# Patient Record
Sex: MALE | Race: WHITE
[De-identification: names, ages, dates, MRNs, and addresses within clinical notes are randomized per-mention and may not be internally consistent; named-entity substitution may affect disease eponyms.]

---

## 2018-09-24 ENCOUNTER — TRANSCRIPTION ENCOUNTER (OUTPATIENT)
Age: 38
End: 2018-09-24

## 2019-11-08 ENCOUNTER — TRANSCRIPTION ENCOUNTER (OUTPATIENT)
Age: 39
End: 2019-11-08

## 2021-01-13 ENCOUNTER — APPOINTMENT (OUTPATIENT)
Dept: GASTROENTEROLOGY | Facility: CLINIC | Age: 41
End: 2021-01-13

## 2021-05-14 ENCOUNTER — APPOINTMENT (OUTPATIENT)
Dept: HEMATOLOGY ONCOLOGY | Facility: CLINIC | Age: 41
End: 2021-05-14
Payer: MEDICAID

## 2021-05-14 ENCOUNTER — LABORATORY RESULT (OUTPATIENT)
Age: 41
End: 2021-05-14

## 2021-05-14 VITALS
TEMPERATURE: 97.3 F | BODY MASS INDEX: 27.16 KG/M2 | SYSTOLIC BLOOD PRESSURE: 128 MMHG | HEART RATE: 97 BPM | HEIGHT: 71 IN | DIASTOLIC BLOOD PRESSURE: 78 MMHG | WEIGHT: 194 LBS | RESPIRATION RATE: 14 BRPM

## 2021-05-14 DIAGNOSIS — Z78.9 OTHER SPECIFIED HEALTH STATUS: ICD-10-CM

## 2021-05-14 LAB
ALBUMIN SERPL ELPH-MCNC: 4.5 G/DL
ALP BLD-CCNC: 85 U/L
ALT SERPL-CCNC: 50 U/L
ANION GAP SERPL CALC-SCNC: 12 MMOL/L
AST SERPL-CCNC: 40 U/L
BILIRUB SERPL-MCNC: 0.5 MG/DL
BUN SERPL-MCNC: 13 MG/DL
CALCIUM SERPL-MCNC: 10 MG/DL
CHLORIDE SERPL-SCNC: 100 MMOL/L
CO2 SERPL-SCNC: 27 MMOL/L
CREAT SERPL-MCNC: 0.8 MG/DL
GLUCOSE SERPL-MCNC: 83 MG/DL
HCT VFR BLD CALC: 40.2 %
HGB BLD-MCNC: 14.2 G/DL
MCHC RBC-ENTMCNC: 32.9 PG
MCHC RBC-ENTMCNC: 35.3 G/DL
MCV RBC AUTO: 93.3 FL
PLATELET # BLD AUTO: 237 K/UL
PMV BLD: 8.9 FL
POTASSIUM SERPL-SCNC: 4.5 MMOL/L
PROT SERPL-MCNC: 7.1 G/DL
RBC # BLD: 4.31 M/UL
RBC # FLD: 18.3 %
SODIUM SERPL-SCNC: 139 MMOL/L
WBC # FLD AUTO: 4.48 K/UL

## 2021-05-14 PROCEDURE — 99205 OFFICE O/P NEW HI 60 MIN: CPT

## 2021-05-14 RX ORDER — FLUOROURACIL 50 MG/ML
500 INJECTION, SOLUTION INTRAVENOUS
Qty: 2 | Refills: 4 | Status: ACTIVE | COMMUNITY
Start: 2021-05-14

## 2021-05-14 NOTE — HISTORY OF PRESENT ILLNESS
[Disease: _____________________] : Disease: [unfilled] [AJCC Stage: ____] : AJCC Stage: [unfilled] [de-identified] : The patient is a 40 year old white male, referred by Dr. Bello, from [place], for continuation of his chemotherapy with Folfox for his relatively recent diagnosis of colon carcinoma, S/P surgery, followed by 7 cycles of the same regimen. \par The patient was having some vague abdominal pain and blood in the stools which prompted the GI work. He had the symptoms for almost a month. He was found a stage III disease. He underwent surgery and he was found to have 13 positive lymph nodes out of 17 dissected. He recovered well from surgery and started the adjuvant chemotherapy treatment as above.\par The patient had some reactions from the chemotherapy including difficulty swallowing, numbness of the mouth, metal taste. Eventually oxaliplatin was dropped after the 6 th cycle and he did well on 5 FU and leucovorin (standard approach). \par The patient did well with the 7th cycle and is willing to continue the same way with the subsequent 5 treatments left.\par At present, the patient is feeling well. No diarrhea, nausea or vomiting. He never had fever with the treatments.\par He had never had a colonoscopy before.  [de-identified] : Adenocarcinoma

## 2021-05-14 NOTE — ASSESSMENT
[FreeTextEntry1] : Stage IIIC colon adenocarcinoma, left side, S/P 6 cycles of Folfox then the 7th one without the oxaliplatin because of toxicity.\par The situation was discussed with the patient. \par We were in touch with Dr. Bello who also sent the patient's records.\par The patient was told that, in addition to those records, the hospital will require the pathology slides for confirmation of the diagnosis. The patient signed a medical record release form.\par \par For today, we will draw CBC, CMP and CEA. \par The patient is scheduled to continue his Folfox regiment without the oxaliplatin for the next remaining 5 treatments.\par He is already aware of the risks and side effects of the treatments. \par \par The patient will be seen on  monthly basis and as needed.\par \par All questions answered.

## 2021-05-14 NOTE — REASON FOR VISIT
[Initial Consultation] : an initial consultation [Parent] : parent [FreeTextEntry2] : Colon carcinoma

## 2021-05-17 LAB — CEA SERPL-MCNC: 1.3 NG/ML

## 2021-05-18 ENCOUNTER — APPOINTMENT (OUTPATIENT)
Dept: INFUSION THERAPY | Facility: CLINIC | Age: 41
End: 2021-05-18

## 2021-05-18 RX ORDER — ONDANSETRON 8 MG/1
8 TABLET ORAL EVERY 8 HOURS
Qty: 45 | Refills: 3 | Status: ACTIVE | COMMUNITY
Start: 2021-05-18 | End: 1900-01-01

## 2021-05-18 RX ORDER — FLUOROURACIL 50 MG/ML
800 INJECTION, SOLUTION INTRAVENOUS ONCE
Refills: 0 | Status: COMPLETED | OUTPATIENT
Start: 2021-05-18 | End: 2021-05-18

## 2021-05-18 RX ORDER — FLUOROURACIL 50 MG/ML
4800 INJECTION, SOLUTION INTRAVENOUS ONCE
Refills: 0 | Status: COMPLETED | OUTPATIENT
Start: 2021-05-18 | End: 2021-05-18

## 2021-05-18 RX ORDER — LEUCOVORIN CALCIUM 5 MG
800 TABLET ORAL ONCE
Refills: 0 | Status: COMPLETED | OUTPATIENT
Start: 2021-05-18 | End: 2021-05-18

## 2021-05-18 RX ORDER — PROCHLORPERAZINE MALEATE 10 MG/1
10 TABLET ORAL EVERY 6 HOURS
Qty: 120 | Refills: 1 | Status: ACTIVE | COMMUNITY
Start: 2021-05-18 | End: 1900-01-01

## 2021-05-18 RX ORDER — ONDANSETRON 8 MG/1
16 TABLET, FILM COATED ORAL ONCE
Refills: 0 | Status: COMPLETED | OUTPATIENT
Start: 2021-05-18 | End: 2021-05-18

## 2021-05-18 RX ADMIN — Medication 145 MILLIGRAM(S): at 11:19

## 2021-05-18 RX ADMIN — ONDANSETRON 16 MILLIGRAM(S): 8 TABLET, FILM COATED ORAL at 10:42

## 2021-05-18 RX ADMIN — Medication 800 MILLIGRAM(S): at 12:50

## 2021-05-18 RX ADMIN — FLUOROURACIL 5.24 MILLIGRAM(S): 50 INJECTION, SOLUTION INTRAVENOUS at 13:10

## 2021-05-18 RX ADMIN — FLUOROURACIL 192 MILLIGRAM(S): 50 INJECTION, SOLUTION INTRAVENOUS at 13:03

## 2021-05-19 ENCOUNTER — NON-APPOINTMENT (OUTPATIENT)
Age: 41
End: 2021-05-19

## 2021-05-20 ENCOUNTER — APPOINTMENT (OUTPATIENT)
Dept: INFUSION THERAPY | Facility: CLINIC | Age: 41
End: 2021-05-20

## 2021-06-01 ENCOUNTER — LABORATORY RESULT (OUTPATIENT)
Age: 41
End: 2021-06-01

## 2021-06-01 ENCOUNTER — APPOINTMENT (OUTPATIENT)
Dept: INFUSION THERAPY | Facility: CLINIC | Age: 41
End: 2021-06-01

## 2021-06-01 DIAGNOSIS — Z00.00 ENCOUNTER FOR GENERAL ADULT MEDICAL EXAMINATION W/OUT ABNORMAL FINDINGS: ICD-10-CM

## 2021-06-01 LAB
HCT VFR BLD CALC: 41.3 %
HGB BLD-MCNC: 14.8 G/DL
MCHC RBC-ENTMCNC: 33.4 PG
MCHC RBC-ENTMCNC: 35.8 G/DL
MCV RBC AUTO: 93.2 FL
PLATELET # BLD AUTO: 225 K/UL
PMV BLD: 10.6 FL
RBC # BLD: 4.43 M/UL
RBC # FLD: 15.2 %
WBC # FLD AUTO: 6.71 K/UL

## 2021-06-01 RX ORDER — ONDANSETRON 8 MG/1
16 TABLET, FILM COATED ORAL ONCE
Refills: 0 | Status: COMPLETED | OUTPATIENT
Start: 2021-06-01 | End: 2021-06-01

## 2021-06-01 RX ORDER — LEUCOVORIN CALCIUM 5 MG
800 TABLET ORAL ONCE
Refills: 0 | Status: COMPLETED | OUTPATIENT
Start: 2021-06-01 | End: 2021-06-01

## 2021-06-01 RX ORDER — FLUOROURACIL 50 MG/ML
4800 INJECTION, SOLUTION INTRAVENOUS ONCE
Refills: 0 | Status: COMPLETED | OUTPATIENT
Start: 2021-06-01 | End: 2021-06-01

## 2021-06-01 RX ORDER — FLUOROURACIL 50 MG/ML
800 INJECTION, SOLUTION INTRAVENOUS ONCE
Refills: 0 | Status: COMPLETED | OUTPATIENT
Start: 2021-06-01 | End: 2021-06-01

## 2021-06-01 RX ADMIN — FLUOROURACIL 192 MILLIGRAM(S): 50 INJECTION, SOLUTION INTRAVENOUS at 11:30

## 2021-06-01 RX ADMIN — ONDANSETRON 16 MILLIGRAM(S): 8 TABLET, FILM COATED ORAL at 09:19

## 2021-06-01 RX ADMIN — FLUOROURACIL 5.24 MILLIGRAM(S): 50 INJECTION, SOLUTION INTRAVENOUS at 11:30

## 2021-06-01 RX ADMIN — Medication 145 MILLIGRAM(S): at 09:48

## 2021-06-03 ENCOUNTER — APPOINTMENT (OUTPATIENT)
Dept: INFUSION THERAPY | Facility: CLINIC | Age: 41
End: 2021-06-03

## 2021-06-15 ENCOUNTER — LABORATORY RESULT (OUTPATIENT)
Age: 41
End: 2021-06-15

## 2021-06-15 ENCOUNTER — APPOINTMENT (OUTPATIENT)
Dept: INFUSION THERAPY | Facility: CLINIC | Age: 41
End: 2021-06-15

## 2021-06-15 ENCOUNTER — APPOINTMENT (OUTPATIENT)
Dept: HEMATOLOGY ONCOLOGY | Facility: CLINIC | Age: 41
End: 2021-06-15
Payer: MEDICAID

## 2021-06-15 VITALS
SYSTOLIC BLOOD PRESSURE: 117 MMHG | DIASTOLIC BLOOD PRESSURE: 77 MMHG | HEIGHT: 71 IN | WEIGHT: 199 LBS | HEART RATE: 91 BPM | RESPIRATION RATE: 16 BRPM | BODY MASS INDEX: 27.86 KG/M2

## 2021-06-15 LAB
HCT VFR BLD CALC: 41.8 %
HGB BLD-MCNC: 14.7 G/DL
MCHC RBC-ENTMCNC: 33.6 PG
MCHC RBC-ENTMCNC: 35.2 G/DL
MCV RBC AUTO: 95.4 FL
PLATELET # BLD AUTO: 199 K/UL
PMV BLD: 10.4 FL
RBC # BLD: 4.38 M/UL
RBC # FLD: 13.9 %
WBC # FLD AUTO: 5.75 K/UL

## 2021-06-15 PROCEDURE — 99214 OFFICE O/P EST MOD 30 MIN: CPT

## 2021-06-15 RX ORDER — ONDANSETRON 8 MG/1
16 TABLET, FILM COATED ORAL ONCE
Refills: 0 | Status: COMPLETED | OUTPATIENT
Start: 2021-06-15 | End: 2021-06-15

## 2021-06-15 RX ORDER — FLUOROURACIL 50 MG/ML
800 INJECTION, SOLUTION INTRAVENOUS ONCE
Refills: 0 | Status: COMPLETED | OUTPATIENT
Start: 2021-06-15 | End: 2021-06-15

## 2021-06-15 RX ORDER — LEUCOVORIN CALCIUM 5 MG
800 TABLET ORAL ONCE
Refills: 0 | Status: COMPLETED | OUTPATIENT
Start: 2021-06-15 | End: 2021-06-15

## 2021-06-15 RX ORDER — FLUOROURACIL 50 MG/ML
4800 INJECTION, SOLUTION INTRAVENOUS ONCE
Refills: 0 | Status: COMPLETED | OUTPATIENT
Start: 2021-06-15 | End: 2021-06-15

## 2021-06-15 RX ADMIN — Medication 145 MILLIGRAM(S): at 11:53

## 2021-06-15 RX ADMIN — ONDANSETRON 16 MILLIGRAM(S): 8 TABLET, FILM COATED ORAL at 10:59

## 2021-06-15 RX ADMIN — FLUOROURACIL 192 MILLIGRAM(S): 50 INJECTION, SOLUTION INTRAVENOUS at 13:58

## 2021-06-15 RX ADMIN — FLUOROURACIL 5.24 MILLIGRAM(S): 50 INJECTION, SOLUTION INTRAVENOUS at 13:59

## 2021-06-15 NOTE — ASSESSMENT
[FreeTextEntry1] : 1. Stage III colon cancer on adjuvant chemotherapy with only 5-FU and leucovorin. Oxaliplatin was discontinued because of severe peripheral neuropathy. This will be cycle #11.\par 2. Peripheral neuropathy secondary to chemotherapy preventing him from his exercising activities especially walking which he is doing cautiously.\par \par We will proceed with cycle 11 of chemotherapy as outlined previously.\par Following the 12th cycle, the patient should undergo a colonoscopy to rule out anastomotic recurrence. \par Also within a year from the initial diagnosis, he should also have his yearly monitoring CT of the abdomen and pelvis with at least a CXR, blood work and follow up visits (initially every 3 months for a year).\par \par All their questions answered.\par \par Follow up in a month.

## 2021-06-15 NOTE — HISTORY OF PRESENT ILLNESS
[Disease: _____________________] : Disease: [unfilled] [AJCC Stage: ____] : AJCC Stage: [unfilled] [de-identified] : The patient is coming for his regularly scheduled follow up and continuation of his adjuvant chemotherapy with 5-FU and leucovorin.\par This will be overall his 11th treatment.\par His major complaints are numbness and tingling in his fingers, hands toes and, occasionally, in the epigastric area. His gait has been affected preventing him from taking walks.\par The appetite is good. No problems with urine or bowel movements. \par He is not back to work. \par  [de-identified] : Adenocarcinoma

## 2021-06-15 NOTE — REVIEW OF SYSTEMS
[Fatigue] : fatigue [Negative] : Heme/Lymph [FreeTextEntry2] : Only when on the chemo [FreeTextEntry7] : Just mild tingling occasionally at the epigastric area

## 2021-06-15 NOTE — PHYSICAL EXAM
[Fully active, able to carry on all pre-disease performance without restriction] : Status 0 - Fully active, able to carry on all pre-disease performance without restriction [Normal] : affect appropriate [de-identified] : A little hesitant gait.

## 2021-06-17 ENCOUNTER — APPOINTMENT (OUTPATIENT)
Dept: INFUSION THERAPY | Facility: CLINIC | Age: 41
End: 2021-06-17

## 2021-06-28 ENCOUNTER — APPOINTMENT (OUTPATIENT)
Dept: INFUSION THERAPY | Facility: CLINIC | Age: 41
End: 2021-06-28

## 2021-06-28 ENCOUNTER — LABORATORY RESULT (OUTPATIENT)
Age: 41
End: 2021-06-28

## 2021-06-28 LAB
ALBUMIN SERPL ELPH-MCNC: 4.8 G/DL
ALP BLD-CCNC: 63 U/L
ALT SERPL-CCNC: 34 U/L
ANION GAP SERPL CALC-SCNC: 14 MMOL/L
AST SERPL-CCNC: 27 U/L
BILIRUB SERPL-MCNC: 0.5 MG/DL
BUN SERPL-MCNC: 14 MG/DL
CALCIUM SERPL-MCNC: 10 MG/DL
CHLORIDE SERPL-SCNC: 101 MMOL/L
CO2 SERPL-SCNC: 24 MMOL/L
CREAT SERPL-MCNC: 0.9 MG/DL
GLUCOSE SERPL-MCNC: 89 MG/DL
HCT VFR BLD CALC: 43.4 %
HGB BLD-MCNC: 15.6 G/DL
MCHC RBC-ENTMCNC: 34 PG
MCHC RBC-ENTMCNC: 35.9 G/DL
MCV RBC AUTO: 94.6 FL
PLATELET # BLD AUTO: 175 K/UL
POTASSIUM SERPL-SCNC: 4.5 MMOL/L
PROT SERPL-MCNC: 7.3 G/DL
RBC # BLD: 4.59 M/UL
RBC # FLD: 13.3 %
SODIUM SERPL-SCNC: 139 MMOL/L
WBC # FLD AUTO: 5.12 K/UL

## 2021-06-28 RX ORDER — LEUCOVORIN CALCIUM 5 MG
800 TABLET ORAL ONCE
Refills: 0 | Status: COMPLETED | OUTPATIENT
Start: 2021-06-28 | End: 2021-06-28

## 2021-06-28 RX ORDER — FLUOROURACIL 50 MG/ML
4800 INJECTION, SOLUTION INTRAVENOUS ONCE
Refills: 0 | Status: COMPLETED | OUTPATIENT
Start: 2021-06-28 | End: 2021-06-28

## 2021-06-28 RX ORDER — FLUOROURACIL 50 MG/ML
800 INJECTION, SOLUTION INTRAVENOUS ONCE
Refills: 0 | Status: COMPLETED | OUTPATIENT
Start: 2021-06-28 | End: 2021-06-28

## 2021-06-28 RX ORDER — ONDANSETRON 8 MG/1
16 TABLET, FILM COATED ORAL ONCE
Refills: 0 | Status: COMPLETED | OUTPATIENT
Start: 2021-06-28 | End: 2021-06-28

## 2021-06-28 RX ADMIN — FLUOROURACIL 5.24 MILLIGRAM(S): 50 INJECTION, SOLUTION INTRAVENOUS at 12:38

## 2021-06-28 RX ADMIN — Medication 145 MILLIGRAM(S): at 10:22

## 2021-06-28 RX ADMIN — ONDANSETRON 16 MILLIGRAM(S): 8 TABLET, FILM COATED ORAL at 10:14

## 2021-06-28 RX ADMIN — FLUOROURACIL 192 MILLIGRAM(S): 50 INJECTION, SOLUTION INTRAVENOUS at 12:33

## 2021-06-30 ENCOUNTER — OUTPATIENT (OUTPATIENT)
Dept: OUTPATIENT SERVICES | Facility: HOSPITAL | Age: 41
LOS: 1 days | Discharge: HOME | End: 2021-06-30

## 2021-06-30 ENCOUNTER — APPOINTMENT (OUTPATIENT)
Dept: INFUSION THERAPY | Facility: CLINIC | Age: 41
End: 2021-06-30

## 2021-06-30 DIAGNOSIS — C18.9 MALIGNANT NEOPLASM OF COLON, UNSPECIFIED: ICD-10-CM

## 2021-07-15 ENCOUNTER — APPOINTMENT (OUTPATIENT)
Dept: HEMATOLOGY ONCOLOGY | Facility: CLINIC | Age: 41
End: 2021-07-15
Payer: MEDICAID

## 2021-07-15 ENCOUNTER — APPOINTMENT (OUTPATIENT)
Dept: INFUSION THERAPY | Facility: CLINIC | Age: 41
End: 2021-07-15
Payer: MEDICAID

## 2021-07-15 ENCOUNTER — LABORATORY RESULT (OUTPATIENT)
Age: 41
End: 2021-07-15

## 2021-07-15 VITALS
SYSTOLIC BLOOD PRESSURE: 124 MMHG | TEMPERATURE: 98.6 F | HEIGHT: 71 IN | HEART RATE: 92 BPM | WEIGHT: 200 LBS | RESPIRATION RATE: 14 BRPM | DIASTOLIC BLOOD PRESSURE: 85 MMHG | BODY MASS INDEX: 28 KG/M2

## 2021-07-15 LAB
ALBUMIN SERPL ELPH-MCNC: 4.6 G/DL
ALP BLD-CCNC: 71 U/L
ALT SERPL-CCNC: 44 U/L
ANION GAP SERPL CALC-SCNC: 11 MMOL/L
APTT BLD: 32.8 SEC
AST SERPL-CCNC: 44 U/L
BILIRUB SERPL-MCNC: 0.5 MG/DL
BUN SERPL-MCNC: 17 MG/DL
CALCIUM SERPL-MCNC: 9.6 MG/DL
CHLORIDE SERPL-SCNC: 104 MMOL/L
CO2 SERPL-SCNC: 23 MMOL/L
CREAT SERPL-MCNC: 0.8 MG/DL
GLUCOSE SERPL-MCNC: 86 MG/DL
HCT VFR BLD CALC: 42.6 %
HGB BLD-MCNC: 15.1 G/DL
INR PPP: 1 RATIO
MCHC RBC-ENTMCNC: 33.6 PG
MCHC RBC-ENTMCNC: 35.4 G/DL
MCV RBC AUTO: 94.9 FL
PLATELET # BLD AUTO: 205 K/UL
PMV BLD: 9.3 FL
POTASSIUM SERPL-SCNC: 4.5 MMOL/L
PROT SERPL-MCNC: 7.1 G/DL
PT BLD: 11.5 SEC
RBC # BLD: 4.49 M/UL
RBC # FLD: 13.2 %
SODIUM SERPL-SCNC: 138 MMOL/L
WBC # FLD AUTO: 5.16 K/UL

## 2021-07-15 PROCEDURE — 99214 OFFICE O/P EST MOD 30 MIN: CPT

## 2021-07-15 NOTE — ASSESSMENT
[FreeTextEntry1] : 1. Colon carcinoma, stage III, left side, S/P surgery and adjuvant chemotherapy completed a few weeks ago, clinically no evidence of relapse.\par 2. Peripheral neuropathy moderate but interfering with the function of hands/fingers. Able to walk reasonably well but with decreased sensation.\par \par The situation was discussed with the patient and his mother. \par The patient is asking to have his Port-A-Cath removed. A call was placed with IR to schedule that.\par In the meantime, today, we will draw CBC, CMP, CEA, INR/PTT from the Port which well be flushed thereafter with heparin. \par The patient was also recommended to see a gastroenterologist for a repeat colonoscopy to rule out an anastomotic recurrence.\par If all the above completed and the blood work acceptable, the patient will be seen in 3 months for follow up.\par His CT scan of the abdomen and pelvis with a CXR will be repeated in November.\par \par All questions answered.

## 2021-07-15 NOTE — REVIEW OF SYSTEMS
[Joint Stiffness] : joint stiffness [Negative] : Heme/Lymph [FreeTextEntry9] : From the neuropathy [de-identified] : Severe peripheral neuropathy affecting hands and feet

## 2021-07-15 NOTE — PHYSICAL EXAM
[Restricted in physically strenuous activity but ambulatory and able to carry out work of a light or sedentary nature] : Status 1- Restricted in physically strenuous activity but ambulatory and able to carry out work of a light or sedentary nature, e.g., light house work, office work [Normal] : affect appropriate [de-identified] : Difficulty flexing fingers and decreased sensation in feet

## 2021-07-15 NOTE — HISTORY OF PRESENT ILLNESS
[Disease: _____________________] : Disease: [unfilled] [AJCC Stage: ____] : AJCC Stage: [unfilled] [de-identified] : The patient is coming for his regularly scheduled follow up for his colon carcinoma, stage III, left side, S/P surgery and chemotherapy with Folfox. The oxaliplatin was discontinued after the 6th cycle because of his severe neuropathy affecting his feet and hands. Because of that, the patient is still unable to work.\par The patient does not have any other complaints.\par The appetite is good. No problems with urine or bowel movements.

## 2021-07-16 LAB — CEA SERPL-MCNC: 1.1 NG/ML

## 2021-08-03 ENCOUNTER — NON-APPOINTMENT (OUTPATIENT)
Age: 41
End: 2021-08-03

## 2021-08-12 ENCOUNTER — APPOINTMENT (OUTPATIENT)
Dept: INFUSION THERAPY | Facility: CLINIC | Age: 41
End: 2021-08-12

## 2021-09-16 ENCOUNTER — APPOINTMENT (OUTPATIENT)
Dept: INFUSION THERAPY | Facility: CLINIC | Age: 41
End: 2021-09-16

## 2021-09-16 ENCOUNTER — OUTPATIENT (OUTPATIENT)
Dept: OUTPATIENT SERVICES | Facility: HOSPITAL | Age: 41
LOS: 1 days | Discharge: HOME | End: 2021-09-16

## 2021-09-16 DIAGNOSIS — C18.9 MALIGNANT NEOPLASM OF COLON, UNSPECIFIED: ICD-10-CM

## 2021-10-07 ENCOUNTER — OUTPATIENT (OUTPATIENT)
Dept: OUTPATIENT SERVICES | Facility: HOSPITAL | Age: 41
LOS: 1 days | Discharge: HOME | End: 2021-10-07
Payer: MEDICAID

## 2021-10-07 VITALS
HEIGHT: 70 IN | OXYGEN SATURATION: 97 % | RESPIRATION RATE: 17 BRPM | TEMPERATURE: 97 F | SYSTOLIC BLOOD PRESSURE: 140 MMHG | HEART RATE: 85 BPM | DIASTOLIC BLOOD PRESSURE: 86 MMHG | WEIGHT: 199.96 LBS

## 2021-10-07 DIAGNOSIS — C18.9 MALIGNANT NEOPLASM OF COLON, UNSPECIFIED: ICD-10-CM

## 2021-10-07 DIAGNOSIS — Z90.49 ACQUIRED ABSENCE OF OTHER SPECIFIED PARTS OF DIGESTIVE TRACT: Chronic | ICD-10-CM

## 2021-10-07 DIAGNOSIS — Z01.818 ENCOUNTER FOR OTHER PREPROCEDURAL EXAMINATION: ICD-10-CM

## 2021-10-07 LAB
ALBUMIN SERPL ELPH-MCNC: 4.9 G/DL — SIGNIFICANT CHANGE UP (ref 3.5–5.2)
ALP SERPL-CCNC: 78 U/L — SIGNIFICANT CHANGE UP (ref 30–115)
ALT FLD-CCNC: 43 U/L — HIGH (ref 0–41)
ANION GAP SERPL CALC-SCNC: 16 MMOL/L — HIGH (ref 7–14)
APTT BLD: 40.5 SEC — HIGH (ref 27–39.2)
AST SERPL-CCNC: 32 U/L — SIGNIFICANT CHANGE UP (ref 0–41)
BASOPHILS # BLD AUTO: 0.04 K/UL — SIGNIFICANT CHANGE UP (ref 0–0.2)
BASOPHILS NFR BLD AUTO: 0.7 % — SIGNIFICANT CHANGE UP (ref 0–1)
BILIRUB SERPL-MCNC: 0.4 MG/DL — SIGNIFICANT CHANGE UP (ref 0.2–1.2)
BUN SERPL-MCNC: 13 MG/DL — SIGNIFICANT CHANGE UP (ref 10–20)
CALCIUM SERPL-MCNC: 10.1 MG/DL — SIGNIFICANT CHANGE UP (ref 8.5–10.1)
CHLORIDE SERPL-SCNC: 101 MMOL/L — SIGNIFICANT CHANGE UP (ref 98–110)
CO2 SERPL-SCNC: 23 MMOL/L — SIGNIFICANT CHANGE UP (ref 17–32)
CREAT SERPL-MCNC: 0.9 MG/DL — SIGNIFICANT CHANGE UP (ref 0.7–1.5)
EOSINOPHIL # BLD AUTO: 0.05 K/UL — SIGNIFICANT CHANGE UP (ref 0–0.7)
EOSINOPHIL NFR BLD AUTO: 0.8 % — SIGNIFICANT CHANGE UP (ref 0–8)
GLUCOSE SERPL-MCNC: 81 MG/DL — SIGNIFICANT CHANGE UP (ref 70–99)
HCT VFR BLD CALC: 50.9 % — SIGNIFICANT CHANGE UP (ref 42–52)
HGB BLD-MCNC: 17.8 G/DL — SIGNIFICANT CHANGE UP (ref 14–18)
IMM GRANULOCYTES NFR BLD AUTO: 0.3 % — SIGNIFICANT CHANGE UP (ref 0.1–0.3)
INR BLD: 1.04 RATIO — SIGNIFICANT CHANGE UP (ref 0.65–1.3)
LYMPHOCYTES # BLD AUTO: 1.84 K/UL — SIGNIFICANT CHANGE UP (ref 1.2–3.4)
LYMPHOCYTES # BLD AUTO: 30.9 % — SIGNIFICANT CHANGE UP (ref 20.5–51.1)
MCHC RBC-ENTMCNC: 31.7 PG — HIGH (ref 27–31)
MCHC RBC-ENTMCNC: 35 G/DL — SIGNIFICANT CHANGE UP (ref 32–37)
MCV RBC AUTO: 90.6 FL — SIGNIFICANT CHANGE UP (ref 80–94)
MONOCYTES # BLD AUTO: 0.39 K/UL — SIGNIFICANT CHANGE UP (ref 0.1–0.6)
MONOCYTES NFR BLD AUTO: 6.6 % — SIGNIFICANT CHANGE UP (ref 1.7–9.3)
NEUTROPHILS # BLD AUTO: 3.61 K/UL — SIGNIFICANT CHANGE UP (ref 1.4–6.5)
NEUTROPHILS NFR BLD AUTO: 60.7 % — SIGNIFICANT CHANGE UP (ref 42.2–75.2)
NRBC # BLD: 0 /100 WBCS — SIGNIFICANT CHANGE UP (ref 0–0)
PLATELET # BLD AUTO: 316 K/UL — SIGNIFICANT CHANGE UP (ref 130–400)
POTASSIUM SERPL-MCNC: 5 MMOL/L — SIGNIFICANT CHANGE UP (ref 3.5–5)
POTASSIUM SERPL-SCNC: 5 MMOL/L — SIGNIFICANT CHANGE UP (ref 3.5–5)
PROT SERPL-MCNC: 7.9 G/DL — SIGNIFICANT CHANGE UP (ref 6–8)
PROTHROM AB SERPL-ACNC: 11.9 SEC — SIGNIFICANT CHANGE UP (ref 9.95–12.87)
RBC # BLD: 5.62 M/UL — SIGNIFICANT CHANGE UP (ref 4.7–6.1)
RBC # FLD: 12.7 % — SIGNIFICANT CHANGE UP (ref 11.5–14.5)
SODIUM SERPL-SCNC: 140 MMOL/L — SIGNIFICANT CHANGE UP (ref 135–146)
WBC # BLD: 5.95 K/UL — SIGNIFICANT CHANGE UP (ref 4.8–10.8)
WBC # FLD AUTO: 5.95 K/UL — SIGNIFICANT CHANGE UP (ref 4.8–10.8)

## 2021-10-07 PROCEDURE — 93010 ELECTROCARDIOGRAM REPORT: CPT

## 2021-10-07 NOTE — H&P PST ADULT - REASON FOR ADMISSION
42 Y/O MALE HERE FOR PRE-ADMISSION SURGICAL TESTING. PATIENT REPORTS HE WAS DIAGNOSED WITH LEFT COLON CA STAGE 3. S/O COLON CA WITH CHEMO. LAST CHEMO 2 MO AGO. NOW FOR PORT REMOVAL.  NOW FOR SCHEDULED PROCEDURE.

## 2021-10-07 NOTE — H&P PST ADULT - NSANTHOSAYNRD_GEN_A_CORE
No. GHADA screening performed.  STOP BANG Legend: 0-2 = LOW Risk; 3-4 = INTERMEDIATE Risk; 5-8 = HIGH Risk

## 2021-10-07 NOTE — H&P PST ADULT - HISTORY OF PRESENT ILLNESS
PATIENT DENIES CHEST PAIN, SHORTNESS OF BREATH, PALPITATIONS, COUGHING, FEVER, DYSURIA.  CAN WALK UP 2-3 FLIGHTS OF STEPS WITHOUT SOB.    NO COUGH, FEVER, SORE THROAT, HEADACHE, LOSS OF TASTE OR SMELL. NO KNOWN EXPOSURE TO ANYONE WITH COVID. PATIENT WAS INSTRUCTED TO ISOLATE FROM NOW UNTIL THE SURGERY.  GOT FIRST DOSE OF PFIZER.    Anesthesia Alert  + Difficult Airway (CLASS IV)  NO--History of neck surgery or radiation  NO--Limited ROM of neck  NO--History of Malignant hyperthermia  NO--Personal or family history of Pseudocholinesterase deficiency  NO--Prior Anesthesia Complication  NO--Latex Allergy  NO--Loose teeth  NO--History of Rheumatoid Arthritis  NO--GHADA (+SNORRING)  NO--bleeding risk

## 2021-10-14 ENCOUNTER — APPOINTMENT (OUTPATIENT)
Dept: INFUSION THERAPY | Facility: CLINIC | Age: 41
End: 2021-10-14
Payer: MEDICAID

## 2021-10-14 ENCOUNTER — APPOINTMENT (OUTPATIENT)
Dept: HEMATOLOGY ONCOLOGY | Facility: CLINIC | Age: 41
End: 2021-10-14
Payer: MEDICAID

## 2021-10-14 ENCOUNTER — OUTPATIENT (OUTPATIENT)
Dept: OUTPATIENT SERVICES | Facility: HOSPITAL | Age: 41
LOS: 1 days | Discharge: HOME | End: 2021-10-14

## 2021-10-14 VITALS
TEMPERATURE: 98.6 F | SYSTOLIC BLOOD PRESSURE: 139 MMHG | WEIGHT: 203 LBS | DIASTOLIC BLOOD PRESSURE: 87 MMHG | RESPIRATION RATE: 14 BRPM | BODY MASS INDEX: 28.42 KG/M2 | HEIGHT: 71 IN | HEART RATE: 94 BPM

## 2021-10-14 DIAGNOSIS — Z90.49 ACQUIRED ABSENCE OF OTHER SPECIFIED PARTS OF DIGESTIVE TRACT: Chronic | ICD-10-CM

## 2021-10-14 PROBLEM — C18.9 MALIGNANT NEOPLASM OF COLON, UNSPECIFIED: Chronic | Status: ACTIVE | Noted: 2021-10-07

## 2021-10-14 PROCEDURE — 99213 OFFICE O/P EST LOW 20 MIN: CPT

## 2021-10-14 NOTE — PHYSICAL EXAM
[Restricted in physically strenuous activity but ambulatory and able to carry out work of a light or sedentary nature] : Status 1- Restricted in physically strenuous activity but ambulatory and able to carry out work of a light or sedentary nature, e.g., light house work, office work [Normal] : affect appropriate [de-identified] : Difficulty flexing fingers and decreased sensation in feet

## 2021-10-14 NOTE — ASSESSMENT
[FreeTextEntry1] : 1. Colon carcinoma, stage III, left side, S/P surgery and adjuvant chemotherapy completed July 2021- clinically no evidence of relapse.\par 2. Peripheral neuropathy moderate but interfering with the function of hands/fingers. Able to walk reasonably well but with decreased sensation.\par \par He will proceed with port flush today. Scheduled for port removal next week. \par CBC and CMP from 10/7/21 reviewed- within normal limits. \par Will check CEA today.\par Scheduled for CT AP and CXR Nov 2021. \par Last colonoscopy was in Sep 2021- reportedly normal. \par \par All questions answered.\par RTC in 3 months \par \par Patient was seen and examined and discussed with . \par

## 2021-10-14 NOTE — REVIEW OF SYSTEMS
[Joint Stiffness] : joint stiffness [Negative] : Heme/Lymph [FreeTextEntry9] : From the neuropathy [de-identified] : Severe peripheral neuropathy affecting hands and feet

## 2021-10-14 NOTE — HISTORY OF PRESENT ILLNESS
[Disease: _____________________] : Disease: [unfilled] [AJCC Stage: ____] : AJCC Stage: [unfilled] [de-identified] : The patient is coming for his regularly scheduled follow up for his colon carcinoma, stage III, left side, S/P surgery and chemotherapy with Folfox which he completed in July 2021. The oxaliplatin was discontinued after the 6th cycle because of his severe neuropathy affecting his feet and hands. \par He had a colonoscopy in September with Dr Parmer and as per pt it was normal, there was no anastomotic recurrence.\par He is scheduled for CT CAP next month. \par He is scheduled for port removal next week. \par \par Overall patient is doing well with no new complaints. He is eating well and his weight is stable. \par No problems with urine or bowel movements.

## 2021-10-15 DIAGNOSIS — C18.9 MALIGNANT NEOPLASM OF COLON, UNSPECIFIED: ICD-10-CM

## 2021-10-15 LAB — CEA SERPL-MCNC: 1.2 NG/ML

## 2021-10-16 ENCOUNTER — OUTPATIENT (OUTPATIENT)
Dept: OUTPATIENT SERVICES | Facility: HOSPITAL | Age: 41
LOS: 1 days | Discharge: HOME | End: 2021-10-16

## 2021-10-16 ENCOUNTER — LABORATORY RESULT (OUTPATIENT)
Age: 41
End: 2021-10-16

## 2021-10-16 DIAGNOSIS — Z11.59 ENCOUNTER FOR SCREENING FOR OTHER VIRAL DISEASES: ICD-10-CM

## 2021-10-16 DIAGNOSIS — Z90.49 ACQUIRED ABSENCE OF OTHER SPECIFIED PARTS OF DIGESTIVE TRACT: Chronic | ICD-10-CM

## 2021-10-19 ENCOUNTER — OUTPATIENT (OUTPATIENT)
Dept: OUTPATIENT SERVICES | Facility: HOSPITAL | Age: 41
LOS: 1 days | Discharge: HOME | End: 2021-10-19
Payer: MEDICAID

## 2021-10-19 ENCOUNTER — RESULT REVIEW (OUTPATIENT)
Age: 41
End: 2021-10-19

## 2021-10-19 DIAGNOSIS — Z90.49 ACQUIRED ABSENCE OF OTHER SPECIFIED PARTS OF DIGESTIVE TRACT: Chronic | ICD-10-CM

## 2021-10-19 PROCEDURE — 36590 REMOVAL TUNNELED CV CATH: CPT

## 2021-10-19 PROCEDURE — 99152 MOD SED SAME PHYS/QHP 5/>YRS: CPT

## 2021-10-19 NOTE — PROGRESS NOTE ADULT - SUBJECTIVE AND OBJECTIVE BOX
PREOPERATIVE DAY OF PROCEDURE EVALUATION:     I have personally seen and examined this patient. I agree with the history and physical which I have reviewed and noted any changes below:     Plan is for removal of right sided port a cath (chemotherapy port). Patient has had port since February 2021 for treatment of colon cancer. Final chemotherapy treatment performed 2 months ago.     Procedure/ risks/ benefits/ goals/ alternatives were explained. All questions answered. Informed content obtained from patient. Consent placed in chart.

## 2021-10-28 DIAGNOSIS — C18.9 MALIGNANT NEOPLASM OF COLON, UNSPECIFIED: ICD-10-CM

## 2021-10-28 DIAGNOSIS — Z45.2 ENCOUNTER FOR ADJUSTMENT AND MANAGEMENT OF VASCULAR ACCESS DEVICE: ICD-10-CM

## 2021-12-13 ENCOUNTER — OUTPATIENT (OUTPATIENT)
Dept: OUTPATIENT SERVICES | Facility: HOSPITAL | Age: 41
LOS: 1 days | Discharge: HOME | End: 2021-12-13
Payer: MEDICAID

## 2021-12-13 ENCOUNTER — RESULT REVIEW (OUTPATIENT)
Age: 41
End: 2021-12-13

## 2021-12-13 DIAGNOSIS — C18.9 MALIGNANT NEOPLASM OF COLON, UNSPECIFIED: ICD-10-CM

## 2021-12-13 DIAGNOSIS — Z90.49 ACQUIRED ABSENCE OF OTHER SPECIFIED PARTS OF DIGESTIVE TRACT: Chronic | ICD-10-CM

## 2021-12-13 PROCEDURE — 71046 X-RAY EXAM CHEST 2 VIEWS: CPT | Mod: 26

## 2021-12-13 PROCEDURE — 74177 CT ABD & PELVIS W/CONTRAST: CPT | Mod: 26

## 2022-02-03 ENCOUNTER — LABORATORY RESULT (OUTPATIENT)
Age: 42
End: 2022-02-03

## 2022-02-03 ENCOUNTER — APPOINTMENT (OUTPATIENT)
Dept: HEMATOLOGY ONCOLOGY | Facility: CLINIC | Age: 42
End: 2022-02-03
Payer: MEDICAID

## 2022-02-03 ENCOUNTER — OUTPATIENT (OUTPATIENT)
Dept: OUTPATIENT SERVICES | Facility: HOSPITAL | Age: 42
LOS: 1 days | Discharge: HOME | End: 2022-02-03

## 2022-02-03 VITALS
WEIGHT: 242 LBS | BODY MASS INDEX: 33.88 KG/M2 | RESPIRATION RATE: 16 BRPM | SYSTOLIC BLOOD PRESSURE: 115 MMHG | DIASTOLIC BLOOD PRESSURE: 96 MMHG | TEMPERATURE: 97.2 F | HEIGHT: 71 IN | HEART RATE: 103 BPM

## 2022-02-03 DIAGNOSIS — E01.0 IODINE-DEFICIENCY RELATED DIFFUSE (ENDEMIC) GOITER: ICD-10-CM

## 2022-02-03 DIAGNOSIS — Z90.49 ACQUIRED ABSENCE OF OTHER SPECIFIED PARTS OF DIGESTIVE TRACT: Chronic | ICD-10-CM

## 2022-02-03 DIAGNOSIS — K76.89 OTHER SPECIFIED DISEASES OF LIVER: ICD-10-CM

## 2022-02-03 LAB
HCT VFR BLD CALC: 51 %
HGB BLD-MCNC: 17.8 G/DL
MCHC RBC-ENTMCNC: 31.6 PG
MCHC RBC-ENTMCNC: 34.9 G/DL
MCV RBC AUTO: 90.6 FL
PLATELET # BLD AUTO: 344 K/UL
PMV BLD: 8.2 FL
RBC # BLD: 5.63 M/UL
RBC # FLD: 13.9 %
WBC # FLD AUTO: 6.89 K/UL

## 2022-02-03 PROCEDURE — 99214 OFFICE O/P EST MOD 30 MIN: CPT

## 2022-02-03 NOTE — ASSESSMENT
[FreeTextEntry1] : 1. Colon carcinoma, stage III, left side, S/P surgery and adjuvant chemotherapy completed July 2021- clinically no evidence of relapse.\par CT abdomen in December 2021 showed scattered liver lesions up to 7 mm , cysts versus metastases (less likely). \par 2. Peripheral neuropathy, moderate but interfering with the function of hands/fingers. Able to walk reasonably well but with decreased sensation. Reports improving now \par \par We will repeat CBC , CMP and CEA today . \par Also repeat CT abdomen ordered to be performed in March.  \par In addition ordered US neck to r/o thyromegaly . \par Last colonoscopy was in September 2021- reportedly normal. Repeat in 2-3 years.\par \par All questions answered.\par RTC in 4 months \par \par Patient was seen and examined and discussed with  who agreed with the above. \par

## 2022-02-03 NOTE — REVIEW OF SYSTEMS
[Joint Stiffness] : joint stiffness [Negative] : Heme/Lymph [FreeTextEntry9] : From the neuropathy [de-identified] : Severe peripheral neuropathy affecting hands and feet

## 2022-02-03 NOTE — HISTORY OF PRESENT ILLNESS
[Disease: _____________________] : Disease: [unfilled] [AJCC Stage: ____] : AJCC Stage: [unfilled] [de-identified] : The patient is coming for his regularly scheduled follow up for his colon carcinoma, stage III, left side, S/P surgery and chemotherapy with Folfox which he completed in July 2021. The oxaliplatin was discontinued after the 6 th cycle because of his severe neuropathy affecting his feet and hands. \par He had a colonoscopy in September with Dr Parmer and, as per pt, it was normal. There was no anastomotic recurrence. We do not have records .\par CT abdomen and pelvis was done in December and it showed small scattered hepatic lesions up to 7 mm , likely cysts but possibility of metastasis could not be ruled out . \par Pt was notified about the results . We will repeat a short term f/u CT abdomen . \par He is s/p port removal .\par \par Overall patient is doing well with no new complaints. He is eating well and is gaining weight.\par No problems with urine or bowel movements. [de-identified] : Adenocarcinoma

## 2022-02-03 NOTE — PHYSICAL EXAM
[Restricted in physically strenuous activity but ambulatory and able to carry out work of a light or sedentary nature] : Status 1- Restricted in physically strenuous activity but ambulatory and able to carry out work of a light or sedentary nature, e.g., light house work, office work [Normal] : RRR, normal S1S2, no murmurs, rubs, gallops [de-identified] : Palpable enlarged thyroid gland? [de-identified] : Difficulty flexing fingers and decreased sensation in feet

## 2022-02-04 DIAGNOSIS — E01.0 IODINE-DEFICIENCY RELATED DIFFUSE (ENDEMIC) GOITER: ICD-10-CM

## 2022-02-04 DIAGNOSIS — C18.9 MALIGNANT NEOPLASM OF COLON, UNSPECIFIED: ICD-10-CM

## 2022-02-04 DIAGNOSIS — K76.9 LIVER DISEASE, UNSPECIFIED: ICD-10-CM

## 2022-02-04 LAB
ALBUMIN SERPL ELPH-MCNC: 4.8 G/DL
ALP BLD-CCNC: 80 U/L
ALT SERPL-CCNC: 30 U/L
ANION GAP SERPL CALC-SCNC: 13 MMOL/L
AST SERPL-CCNC: 25 U/L
BILIRUB SERPL-MCNC: 0.4 MG/DL
BUN SERPL-MCNC: 14 MG/DL
CALCIUM SERPL-MCNC: 9.9 MG/DL
CEA SERPL-MCNC: <0.6 NG/ML
CHLORIDE SERPL-SCNC: 104 MMOL/L
CO2 SERPL-SCNC: 24 MMOL/L
CREAT SERPL-MCNC: 1 MG/DL
GLUCOSE SERPL-MCNC: 76 MG/DL
POTASSIUM SERPL-SCNC: 5.1 MMOL/L
PROT SERPL-MCNC: 7.7 G/DL
SODIUM SERPL-SCNC: 141 MMOL/L

## 2022-03-03 ENCOUNTER — RESULT REVIEW (OUTPATIENT)
Age: 42
End: 2022-03-03

## 2022-03-03 ENCOUNTER — OUTPATIENT (OUTPATIENT)
Dept: OUTPATIENT SERVICES | Facility: HOSPITAL | Age: 42
LOS: 1 days | Discharge: HOME | End: 2022-03-03
Payer: MEDICAID

## 2022-03-03 DIAGNOSIS — C18.9 MALIGNANT NEOPLASM OF COLON, UNSPECIFIED: ICD-10-CM

## 2022-03-03 DIAGNOSIS — Z90.49 ACQUIRED ABSENCE OF OTHER SPECIFIED PARTS OF DIGESTIVE TRACT: Chronic | ICD-10-CM

## 2022-03-03 PROCEDURE — 76536 US EXAM OF HEAD AND NECK: CPT | Mod: 26

## 2022-03-04 ENCOUNTER — OUTPATIENT (OUTPATIENT)
Dept: OUTPATIENT SERVICES | Facility: HOSPITAL | Age: 42
LOS: 1 days | Discharge: HOME | End: 2022-03-04
Payer: MEDICAID

## 2022-03-04 ENCOUNTER — RESULT REVIEW (OUTPATIENT)
Age: 42
End: 2022-03-04

## 2022-03-04 DIAGNOSIS — Z90.49 ACQUIRED ABSENCE OF OTHER SPECIFIED PARTS OF DIGESTIVE TRACT: Chronic | ICD-10-CM

## 2022-03-04 DIAGNOSIS — C18.9 MALIGNANT NEOPLASM OF COLON, UNSPECIFIED: ICD-10-CM

## 2022-03-04 PROCEDURE — 74177 CT ABD & PELVIS W/CONTRAST: CPT | Mod: 26

## 2022-06-23 ENCOUNTER — APPOINTMENT (OUTPATIENT)
Dept: HEMATOLOGY ONCOLOGY | Facility: CLINIC | Age: 42
End: 2022-06-23
Payer: MEDICAID

## 2022-06-23 ENCOUNTER — LABORATORY RESULT (OUTPATIENT)
Age: 42
End: 2022-06-23

## 2022-06-23 ENCOUNTER — OUTPATIENT (OUTPATIENT)
Dept: OUTPATIENT SERVICES | Facility: HOSPITAL | Age: 42
LOS: 1 days | Discharge: HOME | End: 2022-06-23

## 2022-06-23 VITALS
TEMPERATURE: 96.4 F | WEIGHT: 193 LBS | HEIGHT: 71 IN | HEART RATE: 101 BPM | BODY MASS INDEX: 27.02 KG/M2 | DIASTOLIC BLOOD PRESSURE: 90 MMHG | OXYGEN SATURATION: 98 % | RESPIRATION RATE: 16 BRPM | SYSTOLIC BLOOD PRESSURE: 141 MMHG

## 2022-06-23 DIAGNOSIS — Z90.49 ACQUIRED ABSENCE OF OTHER SPECIFIED PARTS OF DIGESTIVE TRACT: Chronic | ICD-10-CM

## 2022-06-23 LAB
HCT VFR BLD CALC: 52.4 %
HGB BLD-MCNC: 17.7 G/DL
MCHC RBC-ENTMCNC: 31.2 PG
MCHC RBC-ENTMCNC: 33.8 G/DL
MCV RBC AUTO: 92.3 FL
PLATELET # BLD AUTO: 368 K/UL
PMV BLD: 8.2 FL
RBC # BLD: 5.68 M/UL
RBC # FLD: 13.7 %
WBC # FLD AUTO: 7.44 K/UL

## 2022-06-23 PROCEDURE — 99214 OFFICE O/P EST MOD 30 MIN: CPT

## 2022-06-23 RX ORDER — CIPROFLOXACIN HYDROCHLORIDE 500 MG/1
500 TABLET, FILM COATED ORAL
Qty: 14 | Refills: 0 | Status: COMPLETED | COMMUNITY
Start: 2022-04-20

## 2022-06-24 LAB
ALBUMIN SERPL ELPH-MCNC: 4.5 G/DL
ALP BLD-CCNC: 87 U/L
ALT SERPL-CCNC: 45 U/L
ANION GAP SERPL CALC-SCNC: 15 MMOL/L
AST SERPL-CCNC: 31 U/L
BILIRUB SERPL-MCNC: 0.4 MG/DL
BUN SERPL-MCNC: 12 MG/DL
CALCIUM SERPL-MCNC: 9.7 MG/DL
CEA SERPL-MCNC: 0.9 NG/ML
CHLORIDE SERPL-SCNC: 101 MMOL/L
CO2 SERPL-SCNC: 24 MMOL/L
CREAT SERPL-MCNC: 0.9 MG/DL
EGFR: 108 ML/MIN/1.73M2
GLUCOSE SERPL-MCNC: 114 MG/DL
POTASSIUM SERPL-SCNC: 4.3 MMOL/L
PROT SERPL-MCNC: 6.9 G/DL
SODIUM SERPL-SCNC: 140 MMOL/L

## 2022-06-24 NOTE — ASSESSMENT
[FreeTextEntry1] : 1. Colon carcinoma, stage III, left side, S/P surgery and adjuvant chemotherapy completed July 2021, clinically no evidence of relapse.\par CT abdomen in December 2021 showed scattered liver lesions up to 7 mm , cysts versus metastases (less likely). CEA has been within normal. \par 2. Peripheral neuropathy secondary to oxaliplatin, moderate but interfering with the function of hands/fingers. Able to walk reasonably well but with decreased sensation. Reports improving now \par \par We will repeat CBC , CMP and CEA today . \par Also repeat CT abdomen performed in March 2022 which showed no evidence of abdominopelvic metastatic disease. \par In addition ordered US neck to r/o thyromegaly which was done 3/7/22 which showed borderline enlarged thyroid gland, no suspicious nodules found but with heterogeneous echotexture.\par Last colonoscopy was in September 2021- reportedly normal. Repeat in 2-3 years.\par \par All questions answered.\par RTC in 4 months \par \par Patient was seen and examined and discussed with  who agreed with the above. \par

## 2022-06-24 NOTE — REVIEW OF SYSTEMS
[Joint Stiffness] : joint stiffness [Negative] : Heme/Lymph [FreeTextEntry9] : From the neuropathy [de-identified] : Severe peripheral neuropathy affecting hands and feet

## 2022-06-24 NOTE — PHYSICAL EXAM
[Restricted in physically strenuous activity but ambulatory and able to carry out work of a light or sedentary nature] : Status 1- Restricted in physically strenuous activity but ambulatory and able to carry out work of a light or sedentary nature, e.g., light house work, office work [Normal] : affect appropriate [de-identified] : Palpable enlarged thyroid gland? [de-identified] : Difficulty flexing fingers and decreased sensation in feet

## 2022-06-24 NOTE — HISTORY OF PRESENT ILLNESS
[Disease: _____________________] : Disease: [unfilled] [AJCC Stage: ____] : AJCC Stage: [unfilled] [de-identified] : The patient is coming for his regularly scheduled follow up for his colon carcinoma, stage III, left side, S/P surgery and chemotherapy with Folfox which he completed in July 2021. The oxaliplatin was discontinued after the 6th cycle because of his severe neuropathy affecting his feet and hands. \par He had a colonoscopy in September 2021 with Dr Maria and, as per pt, it was normal. There was no anastomotic recurrence. We do not have records .\par CT abdomen and pelvis was done 3/4/22 which showed no evidence of abdominopelvic metastatic disease. CXR was likewise negative back in December 2021.\par He is S/P Port-A-Cath removal .\par \par Overall, the patient is doing well with no new complaints. He is eating well and is gaining weight.\par No problems with urine or bowel movements. [de-identified] : Adenocarcinoma

## 2022-06-26 ENCOUNTER — NON-APPOINTMENT (OUTPATIENT)
Age: 42
End: 2022-06-26

## 2022-06-27 DIAGNOSIS — C18.9 MALIGNANT NEOPLASM OF COLON, UNSPECIFIED: ICD-10-CM

## 2023-02-06 ENCOUNTER — OUTPATIENT (OUTPATIENT)
Dept: OUTPATIENT SERVICES | Facility: HOSPITAL | Age: 43
LOS: 1 days | End: 2023-02-06
Payer: MEDICAID

## 2023-02-06 ENCOUNTER — LABORATORY RESULT (OUTPATIENT)
Age: 43
End: 2023-02-06

## 2023-02-06 ENCOUNTER — APPOINTMENT (OUTPATIENT)
Dept: HEMATOLOGY ONCOLOGY | Facility: CLINIC | Age: 43
End: 2023-02-06
Payer: MEDICAID

## 2023-02-06 ENCOUNTER — APPOINTMENT (OUTPATIENT)
Dept: HEMATOLOGY ONCOLOGY | Facility: CLINIC | Age: 43
End: 2023-02-06

## 2023-02-06 VITALS
HEART RATE: 89 BPM | BODY MASS INDEX: 27.86 KG/M2 | WEIGHT: 199 LBS | RESPIRATION RATE: 16 BRPM | HEIGHT: 71 IN | TEMPERATURE: 98.1 F | DIASTOLIC BLOOD PRESSURE: 83 MMHG | SYSTOLIC BLOOD PRESSURE: 140 MMHG

## 2023-02-06 DIAGNOSIS — C18.9 MALIGNANT NEOPLASM OF COLON, UNSPECIFIED: ICD-10-CM

## 2023-02-06 DIAGNOSIS — Z90.49 ACQUIRED ABSENCE OF OTHER SPECIFIED PARTS OF DIGESTIVE TRACT: Chronic | ICD-10-CM

## 2023-02-06 PROCEDURE — 99214 OFFICE O/P EST MOD 30 MIN: CPT

## 2023-02-06 PROCEDURE — 82378 CARCINOEMBRYONIC ANTIGEN: CPT

## 2023-02-06 PROCEDURE — 85027 COMPLETE CBC AUTOMATED: CPT

## 2023-02-06 PROCEDURE — 80053 COMPREHEN METABOLIC PANEL: CPT

## 2023-02-07 DIAGNOSIS — C18.9 MALIGNANT NEOPLASM OF COLON, UNSPECIFIED: ICD-10-CM

## 2023-02-07 LAB
ALBUMIN SERPL ELPH-MCNC: 4.7 G/DL
ALP BLD-CCNC: 75 U/L
ALT SERPL-CCNC: 35 U/L
ANION GAP SERPL CALC-SCNC: 13 MMOL/L
AST SERPL-CCNC: 28 U/L
BILIRUB SERPL-MCNC: 0.5 MG/DL
BUN SERPL-MCNC: 18 MG/DL
CALCIUM SERPL-MCNC: 10.4 MG/DL
CEA SERPL-MCNC: 0.9 NG/ML
CHLORIDE SERPL-SCNC: 103 MMOL/L
CO2 SERPL-SCNC: 25 MMOL/L
CREAT SERPL-MCNC: 0.9 MG/DL
EGFR: 108 ML/MIN/1.73M2
GLUCOSE SERPL-MCNC: 72 MG/DL
HCT VFR BLD CALC: 48.6 %
HGB BLD-MCNC: 16.7 G/DL
MCHC RBC-ENTMCNC: 30.8 PG
MCHC RBC-ENTMCNC: 34.4 G/DL
MCV RBC AUTO: 89.7 FL
PLATELET # BLD AUTO: 320 K/UL
PMV BLD: 8.7 FL
POTASSIUM SERPL-SCNC: 4.7 MMOL/L
PROT SERPL-MCNC: 7.2 G/DL
RBC # BLD: 5.42 M/UL
RBC # FLD: 12.3 %
SODIUM SERPL-SCNC: 141 MMOL/L
WBC # FLD AUTO: 6.46 K/UL

## 2023-02-07 NOTE — END OF VISIT
[FreeTextEntry3] : I was physically present for the key portions of the evaluation and management service provided.  I agree with the history and physical, and plan which I have reviewed and edited where appropriate.\par 
Oriented - self; Oriented - place; Oriented - time

## 2023-02-07 NOTE — ASSESSMENT
[FreeTextEntry1] : 1. Colon carcinoma, stage III, left side, S/P surgery and adjuvant chemotherapy with Folfox completed July 2021, clinically no evidence of relapse.\par CT abdomen/pelvis done in 3/22 was normal. CEA has been within normal. \par 2. Peripheral neuropathy secondary to oxaliplatin, much improved.\par \par We will repeat CBC , CMP and CEA today . \par Also repeat yearly CT abdomen/pelvis and CXR.\par Last colonoscopy was in September 2021- reportedly normal. Repeat in 2-3 years.\par \par All questions answered.\par RTC in 5 months \par \par Patient was seen and examined and discussed with  who agreed with the above. \par

## 2023-02-07 NOTE — PHYSICAL EXAM
[Restricted in physically strenuous activity but ambulatory and able to carry out work of a light or sedentary nature] : Status 1- Restricted in physically strenuous activity but ambulatory and able to carry out work of a light or sedentary nature, e.g., light house work, office work [Normal] : affect appropriate [de-identified] : Palpable enlarged thyroid gland? [de-identified] : Difficulty flexing fingers and decreased sensation in feet but overall improved.

## 2023-02-07 NOTE — REVIEW OF SYSTEMS
[Negative] : Endocrine [Joint Stiffness] : no joint stiffness [de-identified] : peripheral neuropathy affecting hands and feet after chemotherapy, improved significantly

## 2023-02-07 NOTE — HISTORY OF PRESENT ILLNESS
[Disease: _____________________] : Disease: [unfilled] [AJCC Stage: ____] : AJCC Stage: [unfilled] [de-identified] : The patient is coming for his regularly scheduled follow up for his colon carcinoma, stage III, left side, S/P surgery and chemotherapy with Folfox which he completed in July 2021. The oxaliplatin was discontinued after the 6th cycle because of his severe neuropathy affecting his feet and hands. \par He had a colonoscopy in September 2021 with Dr Maria and, as per pt, it was normal. There was no anastomotic recurrence. We do not have records .\par CT abdomen and pelvis was done 3/4/22 which showed no evidence of abdominopelvic metastatic disease. CXR was likewise negative back in December 2021.\par He is S/P Port-A-Cath removal .\par Overall, the patient is doing well with no new complaints. He is eating well and is gaining weight.\par No problems with urine or bowel movements.\par He feels great.\par He denies any medications.\par The neuropathy has improved significantly. [de-identified] : Adenocarcinoma

## 2023-03-03 ENCOUNTER — RESULT REVIEW (OUTPATIENT)
Age: 43
End: 2023-03-03

## 2023-03-03 ENCOUNTER — OUTPATIENT (OUTPATIENT)
Dept: OUTPATIENT SERVICES | Facility: HOSPITAL | Age: 43
LOS: 1 days | End: 2023-03-03
Payer: MEDICAID

## 2023-03-03 DIAGNOSIS — Z90.49 ACQUIRED ABSENCE OF OTHER SPECIFIED PARTS OF DIGESTIVE TRACT: Chronic | ICD-10-CM

## 2023-03-03 DIAGNOSIS — R10.9 UNSPECIFIED ABDOMINAL PAIN: ICD-10-CM

## 2023-03-03 DIAGNOSIS — Z00.8 ENCOUNTER FOR OTHER GENERAL EXAMINATION: ICD-10-CM

## 2023-03-03 DIAGNOSIS — R06.2 WHEEZING: ICD-10-CM

## 2023-03-03 PROCEDURE — 71046 X-RAY EXAM CHEST 2 VIEWS: CPT | Mod: 26

## 2023-03-03 PROCEDURE — 74177 CT ABD & PELVIS W/CONTRAST: CPT | Mod: 26

## 2023-03-03 PROCEDURE — 71046 X-RAY EXAM CHEST 2 VIEWS: CPT

## 2023-03-03 PROCEDURE — 74177 CT ABD & PELVIS W/CONTRAST: CPT

## 2023-03-04 DIAGNOSIS — R10.9 UNSPECIFIED ABDOMINAL PAIN: ICD-10-CM

## 2023-03-04 DIAGNOSIS — R06.2 WHEEZING: ICD-10-CM

## 2023-05-15 NOTE — H&P PST ADULT - SPO2 (%)
97 Additional Notes: Patient consent was obtained to proceed with the visit and recommended plan of care after discussion of all risks and benefits, including the risks of COVID-19 exposure. Detail Level: Simple

## 2023-07-24 ENCOUNTER — APPOINTMENT (OUTPATIENT)
Dept: HEMATOLOGY ONCOLOGY | Facility: CLINIC | Age: 43
End: 2023-07-24
Payer: MEDICAID

## 2023-07-24 ENCOUNTER — LABORATORY RESULT (OUTPATIENT)
Age: 43
End: 2023-07-24

## 2023-07-24 ENCOUNTER — OUTPATIENT (OUTPATIENT)
Dept: OUTPATIENT SERVICES | Facility: HOSPITAL | Age: 43
LOS: 1 days | End: 2023-07-24
Payer: MEDICAID

## 2023-07-24 VITALS
BODY MASS INDEX: 28.7 KG/M2 | RESPIRATION RATE: 16 BRPM | WEIGHT: 205 LBS | DIASTOLIC BLOOD PRESSURE: 92 MMHG | TEMPERATURE: 97.6 F | SYSTOLIC BLOOD PRESSURE: 145 MMHG | HEIGHT: 71 IN | HEART RATE: 90 BPM

## 2023-07-24 DIAGNOSIS — Z90.49 ACQUIRED ABSENCE OF OTHER SPECIFIED PARTS OF DIGESTIVE TRACT: Chronic | ICD-10-CM

## 2023-07-24 DIAGNOSIS — C18.9 MALIGNANT NEOPLASM OF COLON, UNSPECIFIED: ICD-10-CM

## 2023-07-24 PROCEDURE — 99214 OFFICE O/P EST MOD 30 MIN: CPT

## 2023-07-24 PROCEDURE — 82378 CARCINOEMBRYONIC ANTIGEN: CPT

## 2023-07-24 PROCEDURE — 85027 COMPLETE CBC AUTOMATED: CPT

## 2023-07-24 PROCEDURE — 80053 COMPREHEN METABOLIC PANEL: CPT

## 2023-07-25 DIAGNOSIS — C18.9 MALIGNANT NEOPLASM OF COLON, UNSPECIFIED: ICD-10-CM

## 2023-07-25 LAB
ALBUMIN SERPL ELPH-MCNC: 4.8 G/DL
ALP BLD-CCNC: 77 U/L
ALT SERPL-CCNC: 25 U/L
ANION GAP SERPL CALC-SCNC: 10 MMOL/L
AST SERPL-CCNC: 19 U/L
BILIRUB SERPL-MCNC: 0.4 MG/DL
BUN SERPL-MCNC: 19 MG/DL
CALCIUM SERPL-MCNC: 9.9 MG/DL
CHLORIDE SERPL-SCNC: 104 MMOL/L
CO2 SERPL-SCNC: 26 MMOL/L
CREAT SERPL-MCNC: 1 MG/DL
EGFR: 95 ML/MIN/1.73M2
GLUCOSE SERPL-MCNC: 85 MG/DL
HCT VFR BLD CALC: 46.9 %
HGB BLD-MCNC: 16.3 G/DL
MCHC RBC-ENTMCNC: 30.5 PG
MCHC RBC-ENTMCNC: 34.8 G/DL
MCV RBC AUTO: 87.8 FL
PLATELET # BLD AUTO: 357 K/UL
PMV BLD: 8.6 FL
POTASSIUM SERPL-SCNC: 4.4 MMOL/L
PROT SERPL-MCNC: 7 G/DL
RBC # BLD: 5.34 M/UL
RBC # FLD: 12.7 %
SODIUM SERPL-SCNC: 140 MMOL/L
WBC # FLD AUTO: 7.17 K/UL

## 2023-07-25 NOTE — HISTORY OF PRESENT ILLNESS
[Disease: _____________________] : Disease: [unfilled] [AJCC Stage: ____] : AJCC Stage: [unfilled] [de-identified] : The patient is coming for his regularly scheduled follow up for his colon carcinoma, stage III, left side, S/P surgery and chemotherapy with Folfox which he completed in July 2021. The oxaliplatin was discontinued after the 6th cycle because of severe neuropathy affecting his feet and hands. \par He had a colonoscopy in September 2021 with Dr Maria and, as per pt, it was normal. There was no anastomotic recurrence. We do not have records.\par CT A/P from 3/63189 was negative for any abdominopelvic disease, and CXR from 3/2023 also negative for cardiopulmonary disease. \par He is S/P Port-A-Cath removal.\par Overall, the patient is doing well with no new complaints. He is eating well and is gaining weight, he recently joined a gym. \par No problems with urine or bowel movements.\par The neuropathy has improved and is not affecting any of activities of daily living. [de-identified] : Adenocarcinoma

## 2023-07-25 NOTE — ASSESSMENT
[FreeTextEntry1] : 1. Colon carcinoma, stage III, left side, S/P surgery and adjuvant chemotherapy with Folfox completed July 2021, clinically no evidence of relapse.\par CT abdomen/pelvis and CXR done in 3/2023 showed no evidence of disease. CEA has been within normal (0.9). \par 2. Peripheral neuropathy secondary to oxaliplatin, much improved.\par 3. Borderline enlarged thyroid with no suspicious nodules by US but heterogeneous echotexture. To follow up with PMD.\par \par We will repeat CBC, CMP and CEA today. \par Yearly CT abdomen/pelvis and CXR due in March 2024.\par Last colonoscopy was in September 2021- reportedly normal. Patient will be contacting Dr. Maria to schedule repeat colonoscopy. \par \par All questions answered.\par RTC in 5 months or sooner if needed\par \par Patient was seen and examined and discussed with Dr. Dickson who agreed with the above plan of care. \par

## 2023-07-25 NOTE — END OF VISIT
[] : Fellow [FreeTextEntry3] : I was physically present for the key portions of the evaluation and management service provided.  I agree with the history and physical, and plan which I have reviewed with the NP and edited where appropriate.\par

## 2023-07-25 NOTE — REVIEW OF SYSTEMS
[Negative] : Heme/Lymph [Joint Stiffness] : no joint stiffness [de-identified] : peripheral neuropathy affecting hands and feet after chemotherapy, improved significantly

## 2023-07-25 NOTE — PHYSICAL EXAM
[Restricted in physically strenuous activity but ambulatory and able to carry out work of a light or sedentary nature] : Status 1- Restricted in physically strenuous activity but ambulatory and able to carry out work of a light or sedentary nature, e.g., light house work, office work [Normal] : affect appropriate [de-identified] : Difficulty flexing fingers and decreased sensation in feet but overall improved.

## 2023-07-26 LAB — CEA SERPL-MCNC: 0.6 NG/ML

## 2024-01-29 ENCOUNTER — LABORATORY RESULT (OUTPATIENT)
Age: 44
End: 2024-01-29

## 2024-01-29 ENCOUNTER — OUTPATIENT (OUTPATIENT)
Dept: OUTPATIENT SERVICES | Facility: HOSPITAL | Age: 44
LOS: 1 days | End: 2024-01-29
Payer: COMMERCIAL

## 2024-01-29 ENCOUNTER — APPOINTMENT (OUTPATIENT)
Dept: HEMATOLOGY ONCOLOGY | Facility: CLINIC | Age: 44
End: 2024-01-29
Payer: COMMERCIAL

## 2024-01-29 VITALS
BODY MASS INDEX: 28.56 KG/M2 | HEIGHT: 71 IN | HEART RATE: 100 BPM | SYSTOLIC BLOOD PRESSURE: 139 MMHG | DIASTOLIC BLOOD PRESSURE: 83 MMHG | TEMPERATURE: 98.6 F | RESPIRATION RATE: 16 BRPM | WEIGHT: 204 LBS

## 2024-01-29 DIAGNOSIS — Z90.49 ACQUIRED ABSENCE OF OTHER SPECIFIED PARTS OF DIGESTIVE TRACT: Chronic | ICD-10-CM

## 2024-01-29 DIAGNOSIS — C18.9 MALIGNANT NEOPLASM OF COLON, UNSPECIFIED: ICD-10-CM

## 2024-01-29 LAB
ALBUMIN SERPL ELPH-MCNC: 4.8 G/DL
ALP BLD-CCNC: 77 U/L
ALT SERPL-CCNC: 39 U/L
ANION GAP SERPL CALC-SCNC: 11 MMOL/L
AST SERPL-CCNC: 23 U/L
BILIRUB SERPL-MCNC: 0.3 MG/DL
BUN SERPL-MCNC: 15 MG/DL
CALCIUM SERPL-MCNC: 9.8 MG/DL
CHLORIDE SERPL-SCNC: 100 MMOL/L
CO2 SERPL-SCNC: 26 MMOL/L
CREAT SERPL-MCNC: 1 MG/DL
EGFR: 95 ML/MIN/1.73M2
GLUCOSE SERPL-MCNC: 90 MG/DL
HCT VFR BLD CALC: 46.4 %
HGB BLD-MCNC: 16.6 G/DL
MCHC RBC-ENTMCNC: 30.8 PG
MCHC RBC-ENTMCNC: 35.8 G/DL
MCV RBC AUTO: 86.1 FL
PLATELET # BLD AUTO: 376 K/UL
PMV BLD: 8.6 FL
POTASSIUM SERPL-SCNC: 4.3 MMOL/L
PROT SERPL-MCNC: 7.5 G/DL
RBC # BLD: 5.39 M/UL
RBC # FLD: 12.7 %
SODIUM SERPL-SCNC: 137 MMOL/L
WBC # FLD AUTO: 8.23 K/UL

## 2024-01-29 PROCEDURE — 99214 OFFICE O/P EST MOD 30 MIN: CPT

## 2024-01-29 PROCEDURE — 85027 COMPLETE CBC AUTOMATED: CPT

## 2024-01-29 PROCEDURE — 82378 CARCINOEMBRYONIC ANTIGEN: CPT

## 2024-01-29 PROCEDURE — 80053 COMPREHEN METABOLIC PANEL: CPT

## 2024-01-29 NOTE — PHYSICAL EXAM
[Restricted in physically strenuous activity but ambulatory and able to carry out work of a light or sedentary nature] : Status 1- Restricted in physically strenuous activity but ambulatory and able to carry out work of a light or sedentary nature, e.g., light house work, office work [Normal] : affect appropriate [de-identified] : Difficulty flexing fingers and decreased sensation in feet but overall improved.

## 2024-01-29 NOTE — ASSESSMENT
[FreeTextEntry1] : 1. Colon carcinoma, stage III, left side, S/P surgery and adjuvant chemotherapy with Folfox completed July 2021, clinically no evidence of relapse. CT abdomen/pelvis and CXR done in 3/2023 showed no evidence of disease. CEA has been within normal (0.6).  2. Peripheral neuropathy secondary to oxaliplatin, much improved. 3. Borderline enlarged thyroid with no suspicious nodules by US but heterogeneous echotexture. To follow up with PMD.  We will repeat CBC, CMP and CEA today.  Yearly CT abdomen/pelvis and CXR due in March 2024, script given. Colonoscopy done with Dr. Maria in January 2024. Per patient, results were unremarkable. Due again in 3 years.   All questions answered. RTC in 5 months or sooner if needed.   Patient was seen and examined and discussed with Dr. Dickson who agreed with the above plan of care.

## 2024-01-29 NOTE — REVIEW OF SYSTEMS
[Negative] : Heme/Lymph [Joint Stiffness] : no joint stiffness [de-identified] : peripheral neuropathy affecting hands and feet after chemotherapy, improved significantly

## 2024-01-29 NOTE — HISTORY OF PRESENT ILLNESS
[Disease: _____________________] : Disease: [unfilled] [AJCC Stage: ____] : AJCC Stage: [unfilled] [de-identified] : The patient is coming in for his regularly scheduled follow up for his colon carcinoma, stage III left side, S/P surgery and FOLFOX which was completed in July 2021.  Today, he states that he is feeling overall well. No acute complaints.  He recently had his colonoscopy with Dr. Maria on Friday and states that it was "all good".  He is due for another colonoscopy in 3 years. He is back to work, weight and appetite are stable.  Normal urinary and bowel movements.  [de-identified] : Adenocarcinoma

## 2024-01-30 DIAGNOSIS — C18.9 MALIGNANT NEOPLASM OF COLON, UNSPECIFIED: ICD-10-CM

## 2024-01-30 LAB — CEA SERPL-MCNC: <0.6 NG/ML

## 2024-03-22 ENCOUNTER — RESULT REVIEW (OUTPATIENT)
Age: 44
End: 2024-03-22

## 2024-03-22 ENCOUNTER — OUTPATIENT (OUTPATIENT)
Dept: OUTPATIENT SERVICES | Facility: HOSPITAL | Age: 44
LOS: 1 days | End: 2024-03-22
Payer: COMMERCIAL

## 2024-03-22 DIAGNOSIS — C18.9 MALIGNANT NEOPLASM OF COLON, UNSPECIFIED: ICD-10-CM

## 2024-03-22 DIAGNOSIS — Z90.49 ACQUIRED ABSENCE OF OTHER SPECIFIED PARTS OF DIGESTIVE TRACT: Chronic | ICD-10-CM

## 2024-03-22 PROCEDURE — 71046 X-RAY EXAM CHEST 2 VIEWS: CPT | Mod: 26

## 2024-03-22 PROCEDURE — 74177 CT ABD & PELVIS W/CONTRAST: CPT

## 2024-03-22 PROCEDURE — 71046 X-RAY EXAM CHEST 2 VIEWS: CPT

## 2024-03-22 PROCEDURE — 74177 CT ABD & PELVIS W/CONTRAST: CPT | Mod: 26

## 2024-03-23 DIAGNOSIS — C18.9 MALIGNANT NEOPLASM OF COLON, UNSPECIFIED: ICD-10-CM

## 2024-06-24 ENCOUNTER — APPOINTMENT (OUTPATIENT)
Age: 44
End: 2024-06-24

## 2024-07-26 ENCOUNTER — OUTPATIENT (OUTPATIENT)
Dept: OUTPATIENT SERVICES | Facility: HOSPITAL | Age: 44
LOS: 1 days | End: 2024-07-26
Payer: COMMERCIAL

## 2024-07-26 ENCOUNTER — LABORATORY RESULT (OUTPATIENT)
Age: 44
End: 2024-07-26

## 2024-07-26 ENCOUNTER — APPOINTMENT (OUTPATIENT)
Age: 44
End: 2024-07-26
Payer: COMMERCIAL

## 2024-07-26 VITALS
SYSTOLIC BLOOD PRESSURE: 143 MMHG | TEMPERATURE: 97.9 F | DIASTOLIC BLOOD PRESSURE: 87 MMHG | WEIGHT: 210.25 LBS | OXYGEN SATURATION: 94 % | BODY MASS INDEX: 29.44 KG/M2 | HEART RATE: 110 BPM | HEIGHT: 71 IN

## 2024-07-26 DIAGNOSIS — Z90.49 ACQUIRED ABSENCE OF OTHER SPECIFIED PARTS OF DIGESTIVE TRACT: Chronic | ICD-10-CM

## 2024-07-26 DIAGNOSIS — C18.9 MALIGNANT NEOPLASM OF COLON, UNSPECIFIED: ICD-10-CM

## 2024-07-26 LAB
HCT VFR BLD CALC: 47.7 %
HGB BLD-MCNC: 16.6 G/DL
MCHC RBC-ENTMCNC: 31 PG
MCHC RBC-ENTMCNC: 34.8 G/DL
MCV RBC AUTO: 89.2 FL
PLATELET # BLD AUTO: 363 K/UL
PMV BLD: 8.7 FL
RBC # BLD: 5.35 M/UL
RBC # FLD: 13 %
WBC # FLD AUTO: 9.72 K/UL

## 2024-07-26 PROCEDURE — 82378 CARCINOEMBRYONIC ANTIGEN: CPT

## 2024-07-26 PROCEDURE — 99214 OFFICE O/P EST MOD 30 MIN: CPT

## 2024-07-26 PROCEDURE — 85027 COMPLETE CBC AUTOMATED: CPT

## 2024-07-26 PROCEDURE — 80053 COMPREHEN METABOLIC PANEL: CPT

## 2024-07-26 NOTE — ASSESSMENT
[FreeTextEntry1] : 1. Colon carcinoma, stage III, left side, S/P surgery and adjuvant chemotherapy with Folfox completed July 2021, clinically no evidence of relapse. CT abdomen/pelvis and CXR done in 3/2024 showed no evidence of disease. CEA has been within normal (0.6).  2. Peripheral neuropathy secondary to oxaliplatin, much improved.   We will repeat CBC, CMP and CEA today.  Colonoscopy done with Dr. Maria in January 2024. Per patient, results were unremarkable. Due again in 3 years.   All questions answered. RTC in 6 months or sooner if needed.

## 2024-07-26 NOTE — HISTORY OF PRESENT ILLNESS
[Disease: _____________________] : Disease: [unfilled] [AJCC Stage: ____] : AJCC Stage: [unfilled] [de-identified] : The patient is coming in for his regularly scheduled follow up for his L sided colon carcinoma, stage III, S/P surgery and FOLFOX which was completed in July 2021.  He feels well, has no new complaints. He denies any abdominal pain, nausea or vomiting, no change in urine or bowel habits. Colonoscopy was done 1/24, recommended to repeat in 3 years. He denies any new medications. [de-identified] : Adenocarcinoma

## 2024-07-26 NOTE — REVIEW OF SYSTEMS
[Negative] : Heme/Lymph [Joint Stiffness] : no joint stiffness [de-identified] : peripheral neuropathy affecting hands and feet after chemotherapy, improved significantly

## 2024-07-27 DIAGNOSIS — C18.9 MALIGNANT NEOPLASM OF COLON, UNSPECIFIED: ICD-10-CM

## 2024-07-29 LAB
ALBUMIN SERPL ELPH-MCNC: 5 G/DL
ALP BLD-CCNC: 75 U/L
ALT SERPL-CCNC: 27 U/L
ANION GAP SERPL CALC-SCNC: 18 MMOL/L
AST SERPL-CCNC: 20 U/L
BILIRUB SERPL-MCNC: 0.3 MG/DL
BUN SERPL-MCNC: 19 MG/DL
CALCIUM SERPL-MCNC: 10.3 MG/DL
CEA SERPL-MCNC: 0.6 NG/ML
CHLORIDE SERPL-SCNC: 99 MMOL/L
CO2 SERPL-SCNC: 23 MMOL/L
CREAT SERPL-MCNC: 1 MG/DL
EGFR: 94 ML/MIN/1.73M2
GLUCOSE SERPL-MCNC: 84 MG/DL
POTASSIUM SERPL-SCNC: 4.5 MMOL/L
PROT SERPL-MCNC: 7.4 G/DL
SODIUM SERPL-SCNC: 140 MMOL/L

## 2025-01-21 ENCOUNTER — APPOINTMENT (OUTPATIENT)
Age: 45
End: 2025-01-21

## 2025-02-27 ENCOUNTER — APPOINTMENT (OUTPATIENT)
Age: 45
End: 2025-02-27
Payer: COMMERCIAL

## 2025-02-27 ENCOUNTER — OUTPATIENT (OUTPATIENT)
Dept: OUTPATIENT SERVICES | Facility: HOSPITAL | Age: 45
LOS: 1 days | End: 2025-02-27
Payer: COMMERCIAL

## 2025-02-27 VITALS
HEART RATE: 98 BPM | HEIGHT: 71 IN | SYSTOLIC BLOOD PRESSURE: 146 MMHG | BODY MASS INDEX: 29.54 KG/M2 | WEIGHT: 211 LBS | DIASTOLIC BLOOD PRESSURE: 89 MMHG | RESPIRATION RATE: 16 BRPM | TEMPERATURE: 98.3 F

## 2025-02-27 DIAGNOSIS — C18.9 MALIGNANT NEOPLASM OF COLON, UNSPECIFIED: ICD-10-CM

## 2025-02-27 DIAGNOSIS — Z90.49 ACQUIRED ABSENCE OF OTHER SPECIFIED PARTS OF DIGESTIVE TRACT: Chronic | ICD-10-CM

## 2025-02-27 LAB
ALBUMIN SERPL ELPH-MCNC: 4.9 G/DL
ALP BLD-CCNC: 77 U/L
ALT SERPL-CCNC: 29 U/L
ANION GAP SERPL CALC-SCNC: 12 MMOL/L
AST SERPL-CCNC: 20 U/L
AUTO BASOPHILS #: 0.05 K/UL
AUTO BASOPHILS %: 0.7 %
AUTO EOSINOPHILS #: 0.12 K/UL
AUTO EOSINOPHILS %: 1.8 %
AUTO IMMATURE GRANULOCYTES #: 0.01 K/UL
AUTO LYMPHOCYTES #: 2.01 K/UL
AUTO LYMPHOCYTES %: 29.8 %
AUTO MONOCYTES #: 0.52 K/UL
AUTO MONOCYTES %: 7.7 %
AUTO NEUTROPHILS #: 4.03 K/UL
AUTO NEUTROPHILS %: 59.9 %
AUTO NRBC #: 0 K/UL
BILIRUB SERPL-MCNC: 0.3 MG/DL
BUN SERPL-MCNC: 14 MG/DL
CALCIUM SERPL-MCNC: 9.9 MG/DL
CHLORIDE SERPL-SCNC: 103 MMOL/L
CO2 SERPL-SCNC: 26 MMOL/L
CREAT SERPL-MCNC: 0.9 MG/DL
EGFR: 107 ML/MIN/1.73M2
GLUCOSE SERPL-MCNC: 87 MG/DL
HCT VFR BLD CALC: 48.5 %
HGB BLD-MCNC: 16.8 G/DL
IMM GRANULOCYTES NFR BLD AUTO: 0.1 %
MAN DIFF?: NORMAL
MCHC RBC-ENTMCNC: 31.4 PG
MCHC RBC-ENTMCNC: 34.6 G/DL
MCV RBC AUTO: 90.7 FL
PLATELET # BLD AUTO: 335 K/UL
PMV BLD AUTO: 0 /100 WBCS
PMV BLD: 8.4 FL
POTASSIUM SERPL-SCNC: 4.7 MMOL/L
PROT SERPL-MCNC: 7.2 G/DL
RBC # BLD: 5.35 M/UL
RBC # FLD: 13.2 %
SODIUM SERPL-SCNC: 141 MMOL/L
WBC # FLD AUTO: 6.74 K/UL

## 2025-02-27 PROCEDURE — 99214 OFFICE O/P EST MOD 30 MIN: CPT

## 2025-02-27 PROCEDURE — 82378 CARCINOEMBRYONIC ANTIGEN: CPT

## 2025-02-27 PROCEDURE — 85025 COMPLETE CBC W/AUTO DIFF WBC: CPT

## 2025-02-27 PROCEDURE — 80053 COMPREHEN METABOLIC PANEL: CPT

## 2025-02-28 DIAGNOSIS — C18.9 MALIGNANT NEOPLASM OF COLON, UNSPECIFIED: ICD-10-CM

## 2025-02-28 LAB — CEA SERPL-MCNC: 0.8 NG/ML

## 2025-04-07 ENCOUNTER — RESULT REVIEW (OUTPATIENT)
Age: 45
End: 2025-04-07

## 2025-04-07 ENCOUNTER — OUTPATIENT (OUTPATIENT)
Dept: OUTPATIENT SERVICES | Facility: HOSPITAL | Age: 45
LOS: 1 days | End: 2025-04-07
Payer: COMMERCIAL

## 2025-04-07 DIAGNOSIS — C18.9 MALIGNANT NEOPLASM OF COLON, UNSPECIFIED: ICD-10-CM

## 2025-04-07 DIAGNOSIS — Z90.49 ACQUIRED ABSENCE OF OTHER SPECIFIED PARTS OF DIGESTIVE TRACT: Chronic | ICD-10-CM

## 2025-04-07 PROCEDURE — 74177 CT ABD & PELVIS W/CONTRAST: CPT | Mod: 26

## 2025-04-07 PROCEDURE — 71046 X-RAY EXAM CHEST 2 VIEWS: CPT | Mod: 26

## 2025-04-07 PROCEDURE — 74177 CT ABD & PELVIS W/CONTRAST: CPT

## 2025-04-07 PROCEDURE — 71046 X-RAY EXAM CHEST 2 VIEWS: CPT

## 2025-04-08 DIAGNOSIS — C18.9 MALIGNANT NEOPLASM OF COLON, UNSPECIFIED: ICD-10-CM

## 2025-04-17 ENCOUNTER — NON-APPOINTMENT (OUTPATIENT)
Age: 45
End: 2025-04-17